# Patient Record
Sex: MALE | URBAN - METROPOLITAN AREA
[De-identification: names, ages, dates, MRNs, and addresses within clinical notes are randomized per-mention and may not be internally consistent; named-entity substitution may affect disease eponyms.]

---

## 2020-09-08 ENCOUNTER — OFFICE VISIT (OUTPATIENT)
Dept: URBAN - METROPOLITAN AREA CLINIC 72 | Facility: CLINIC | Age: 60
End: 2020-09-08
Payer: MEDICARE

## 2020-09-08 DIAGNOSIS — E11.9 TYPE 2 DIABETES MELLITUS W/O COMPLICATION: Primary | ICD-10-CM

## 2020-09-08 DIAGNOSIS — H25.13 AGE-RELATED NUCLEAR CATARACT, BILATERAL: ICD-10-CM

## 2020-09-08 DIAGNOSIS — H52.13 MYOPIA, BILATERAL: ICD-10-CM

## 2020-09-08 PROCEDURE — 99204 OFFICE O/P NEW MOD 45 MIN: CPT | Performed by: OPTOMETRIST

## 2020-09-08 ASSESSMENT — INTRAOCULAR PRESSURE
OD: 12
OS: 13

## 2020-09-08 NOTE — IMPRESSION/PLAN
Impression: Type 2 diabetes mellitus w/o complication: X11.8. Plan: The clinical exam is consistent with Type 2 DM without retinopathy. The patient was advised to maintain tight blood sugar, blood pressure, and lipid control, and to see us again in 1 year for a repeat dilated fundus examination. The patient was also advised to schedule an appointment with a primary care physician for a diabetic evaluation and counseling to avoid the systemic complications of diabetes.

## 2020-09-08 NOTE — IMPRESSION/PLAN
Impression: Myopia, bilateral: H52.13. Plan: pt dilated prematurely before refraction. recommend pt RTC N/A for refraction recheck.  before filling RX

## 2020-09-09 ENCOUNTER — OFFICE VISIT (OUTPATIENT)
Dept: URBAN - METROPOLITAN AREA CLINIC 72 | Facility: CLINIC | Age: 60
End: 2020-09-09

## 2020-09-09 PROCEDURE — V2799 MISC VISION ITEM OR SERVICE: HCPCS | Performed by: OPTOMETRIST

## 2020-09-09 ASSESSMENT — INTRAOCULAR PRESSURE
OD: 11
OS: 12

## 2020-09-09 ASSESSMENT — VISUAL ACUITY
OD: 20/20
OS: 20/20

## 2020-09-09 NOTE — IMPRESSION/PLAN
Impression: Myopia, bilateral: H52.13.

small shift in refraction from previous dilated exam yesterday. new glasses RX given today Plan: A glasses Rx has been generated and dispensed. Pt to call with any concerns.